# Patient Record
Sex: MALE | Race: BLACK OR AFRICAN AMERICAN | Employment: PART TIME | URBAN - METROPOLITAN AREA
[De-identification: names, ages, dates, MRNs, and addresses within clinical notes are randomized per-mention and may not be internally consistent; named-entity substitution may affect disease eponyms.]

---

## 2019-09-20 ENCOUNTER — HOSPITAL ENCOUNTER (OUTPATIENT)
Age: 19
Setting detail: OBSERVATION
Discharge: HOME | End: 2019-09-22
Attending: EMERGENCY MEDICINE | Admitting: HOSPITALIST
Payer: MEDICARE

## 2019-09-20 ENCOUNTER — APPOINTMENT (OUTPATIENT)
Dept: CT IMAGING | Age: 19
End: 2019-09-20
Attending: EMERGENCY MEDICINE
Payer: MEDICARE

## 2019-09-20 DIAGNOSIS — R56.9 SEIZURE (HCC): Primary | ICD-10-CM

## 2019-09-20 DIAGNOSIS — R56.9 POSTICTAL STATE (HCC): ICD-10-CM

## 2019-09-20 DIAGNOSIS — N17.9 AKI (ACUTE KIDNEY INJURY) (HCC): ICD-10-CM

## 2019-09-20 LAB
ANION GAP SERPL CALC-SCNC: 15 MMOL/L (ref 3–18)
BASOPHILS # BLD: 0 K/UL (ref 0–0.1)
BASOPHILS NFR BLD: 0 % (ref 0–2)
BUN SERPL-MCNC: 13 MG/DL (ref 7–18)
BUN/CREAT SERPL: 9 (ref 12–20)
CALCIUM SERPL-MCNC: 9.2 MG/DL (ref 8.5–10.1)
CHLORIDE SERPL-SCNC: 103 MMOL/L (ref 100–111)
CK SERPL-CCNC: 265 U/L (ref 39–308)
CO2 SERPL-SCNC: 20 MMOL/L (ref 21–32)
CREAT SERPL-MCNC: 1.48 MG/DL (ref 0.6–1.3)
DIFFERENTIAL METHOD BLD: ABNORMAL
EOSINOPHIL # BLD: 0 K/UL (ref 0–0.4)
EOSINOPHIL NFR BLD: 1 % (ref 0–5)
ERYTHROCYTE [DISTWIDTH] IN BLOOD BY AUTOMATED COUNT: 12.8 % (ref 11.6–14.5)
GLUCOSE SERPL-MCNC: 85 MG/DL (ref 74–99)
HCT VFR BLD AUTO: 40.1 % (ref 36–48)
HGB BLD-MCNC: 13.4 G/DL (ref 13–16)
LYMPHOCYTES # BLD: 2.4 K/UL (ref 0.9–3.6)
LYMPHOCYTES NFR BLD: 47 % (ref 21–52)
MCH RBC QN AUTO: 28.9 PG (ref 24–34)
MCHC RBC AUTO-ENTMCNC: 33.4 G/DL (ref 31–37)
MCV RBC AUTO: 86.6 FL (ref 74–97)
MONOCYTES # BLD: 0.5 K/UL (ref 0.05–1.2)
MONOCYTES NFR BLD: 10 % (ref 3–10)
NEUTS SEG # BLD: 2.2 K/UL (ref 1.8–8)
NEUTS SEG NFR BLD: 42 % (ref 40–73)
PLATELET # BLD AUTO: 330 K/UL (ref 135–420)
PMV BLD AUTO: 10.1 FL (ref 9.2–11.8)
POTASSIUM SERPL-SCNC: 3.5 MMOL/L (ref 3.5–5.5)
RBC # BLD AUTO: 4.63 M/UL (ref 4.7–5.5)
SODIUM SERPL-SCNC: 138 MMOL/L (ref 136–145)
WBC # BLD AUTO: 5.2 K/UL (ref 4.6–13.2)

## 2019-09-20 PROCEDURE — 99285 EMERGENCY DEPT VISIT HI MDM: CPT

## 2019-09-20 PROCEDURE — 80048 BASIC METABOLIC PNL TOTAL CA: CPT

## 2019-09-20 PROCEDURE — 96374 THER/PROPH/DIAG INJ IV PUSH: CPT

## 2019-09-20 PROCEDURE — 74011250636 HC RX REV CODE- 250/636: Performed by: EMERGENCY MEDICINE

## 2019-09-20 PROCEDURE — 82550 ASSAY OF CK (CPK): CPT

## 2019-09-20 PROCEDURE — 96361 HYDRATE IV INFUSION ADD-ON: CPT

## 2019-09-20 PROCEDURE — 85025 COMPLETE CBC W/AUTO DIFF WBC: CPT

## 2019-09-20 PROCEDURE — 70450 CT HEAD/BRAIN W/O DYE: CPT

## 2019-09-20 RX ADMIN — SODIUM CHLORIDE, SODIUM LACTATE, POTASSIUM CHLORIDE, AND CALCIUM CHLORIDE 1000 ML: 600; 310; 30; 20 INJECTION, SOLUTION INTRAVENOUS at 23:22

## 2019-09-21 PROBLEM — R56.9 SEIZURE (HCC): Status: ACTIVE | Noted: 2019-09-21

## 2019-09-21 PROBLEM — N17.9 AKI (ACUTE KIDNEY INJURY) (HCC): Status: ACTIVE | Noted: 2019-09-21

## 2019-09-21 LAB
ANION GAP SERPL CALC-SCNC: 7 MMOL/L (ref 3–18)
BUN SERPL-MCNC: 9 MG/DL (ref 7–18)
BUN/CREAT SERPL: 8 (ref 12–20)
CALCIUM SERPL-MCNC: 9 MG/DL (ref 8.5–10.1)
CHLORIDE SERPL-SCNC: 109 MMOL/L (ref 100–111)
CO2 SERPL-SCNC: 26 MMOL/L (ref 21–32)
CREAT SERPL-MCNC: 1.09 MG/DL (ref 0.6–1.3)
GLUCOSE SERPL-MCNC: 84 MG/DL (ref 74–99)
POTASSIUM SERPL-SCNC: 4.1 MMOL/L (ref 3.5–5.5)
SODIUM SERPL-SCNC: 142 MMOL/L (ref 136–145)

## 2019-09-21 PROCEDURE — 80048 BASIC METABOLIC PNL TOTAL CA: CPT

## 2019-09-21 PROCEDURE — 96374 THER/PROPH/DIAG INJ IV PUSH: CPT

## 2019-09-21 PROCEDURE — 74011000258 HC RX REV CODE- 258: Performed by: HOSPITALIST

## 2019-09-21 PROCEDURE — 36415 COLL VENOUS BLD VENIPUNCTURE: CPT

## 2019-09-21 PROCEDURE — 99218 HC RM OBSERVATION: CPT

## 2019-09-21 PROCEDURE — 74011250636 HC RX REV CODE- 250/636: Performed by: EMERGENCY MEDICINE

## 2019-09-21 PROCEDURE — 96376 TX/PRO/DX INJ SAME DRUG ADON: CPT

## 2019-09-21 PROCEDURE — 74011000258 HC RX REV CODE- 258: Performed by: EMERGENCY MEDICINE

## 2019-09-21 PROCEDURE — 74011250636 HC RX REV CODE- 250/636: Performed by: HOSPITALIST

## 2019-09-21 RX ORDER — ACETAMINOPHEN 325 MG/1
650 TABLET ORAL
Status: DISCONTINUED | OUTPATIENT
Start: 2019-09-21 | End: 2019-09-22 | Stop reason: HOSPADM

## 2019-09-21 RX ORDER — SODIUM CHLORIDE 9 MG/ML
100 INJECTION, SOLUTION INTRAVENOUS CONTINUOUS
Status: DISCONTINUED | OUTPATIENT
Start: 2019-09-21 | End: 2019-09-22 | Stop reason: HOSPADM

## 2019-09-21 RX ADMIN — LEVETIRACETAM 1000 MG: 100 INJECTION INTRAVENOUS at 01:39

## 2019-09-21 RX ADMIN — SODIUM CHLORIDE 100 ML/HR: 900 INJECTION, SOLUTION INTRAVENOUS at 03:56

## 2019-09-21 RX ADMIN — SODIUM CHLORIDE 100 ML/HR: 900 INJECTION, SOLUTION INTRAVENOUS at 22:02

## 2019-09-21 RX ADMIN — LEVETIRACETAM 500 MG: 100 INJECTION, SOLUTION INTRAVENOUS at 13:08

## 2019-09-21 NOTE — PROGRESS NOTES
TRANSFER - IN REPORT:    Verbal report received from Marva(name) on Lilia Ganser  being received from ED(unit) for routine progression of care      Report consisted of patients Situation, Background, Assessment and   Recommendations(SBAR). Information from the following report(s) SBAR, Kardex, ED Summary, Intake/Output, MAR and Accordion was reviewed with the receiving nurse. Opportunity for questions and clarification was provided. Assessment completed upon patients arrival to unit and care assumed. Pt ambulated to bathroom with RN in room. Required documentation complete. No c/o pain. NAD. Call light within reach. Will continue to monitor.

## 2019-09-21 NOTE — H&P
History and Physical    Patient: Oralia Mora               Sex: male          DOA: 9/20/2019       YOB: 2000      Age:  20 y.o.        LOS:  LOS: 0 days        CHIEF COMPLAINT: Seizure    Assessment/Plan:     Active Problems:    Seizure ([facility] [address] (9/21/2019)      KORY (acute kidney injury) ([facility] [address] (9/21/2019)      PLAN:  - New onset seizure: Cont IV keppra 500mg bid, monitor for further seizures  - KORY: Cont IV fluids, suspect dehydration. Recheck BMP in AM  - If seizure free can likely d/c on keppra, but will need close outpatient f/u with Neurology    HPI:     Oralia Mora is a 20 y.o. male with h/o traumatic brain injury who presents with seizure. Per EMS, pt had two witnessed seizures at home and EMS was called. Pt does not have h/o seizures but does have h/o TBI in the past. Per ED physician, he presented in a post-ictal state for several hours. He was given IV keppra. He eventually returned to baseline, now he is alert and oriented, with normal speech and affect, but he does not recall the episode of seizures. His last memory is spending time with friends at home. ED physician attempted to transfer the patient to [address] [place] [address] for EEG but they declined. TeleNeurology was consulted and per ED physician Candelario Arroyo stated pt does not need inpatient EEG so he was admitted to [facility] for observation for new onset seizure. Past Medical History:   Diagnosis Date    Brain bleed [facility])        Social History:  Social History     Socioeconomic History    Marital status: SINGLE     Spouse name: Not on file    Number of children: Not on file    Years of education: Not on file    Highest education level: Not on file   Tobacco Use    Smoking status: Passive Smoke Exposure - Never Smoker   Substance and Sexual Activity    Alcohol use: No    Drug use: No       Family History:  No family history on file.     Allergies:  No Known Allergies    Home Medications:  Prior to Admission medications    Not on File         Review of Systems  Review of Systems   Constitutional: Negative for chills and fever. HENT: Negative for congestion and hearing loss. Eyes: Negative for blurred vision and double vision. Respiratory: Negative for cough and shortness of breath. Cardiovascular: Negative for chest pain and palpitations. Gastrointestinal: Negative for abdominal pain, nausea and vomiting. Genitourinary: Negative for dysuria and hematuria. Musculoskeletal: Negative for falls and myalgias. Skin: Negative for rash. Neurological: Positive for seizures. Negative for dizziness and focal weakness. Psychiatric/Behavioral: Negative. Objective:      Visit Vitals  /71   Pulse 70   Temp 98.6 °F (37 °C)   Resp 18   SpO2 98%       Physical Exam:  Ears: hearing is intact  Eyes: Icterus is not present  Lungs: clear to auscultation bilaterally  Heart: regular rate and rhythm, S1, S2 normal, no murmur, click, rub or gallop  Gastrointestinal: soft, non-tender.  Bowel sounds normal. No masses,  no organomegaly  Neurological:  New Focal Deficits are not present  Psychiatric:  Mood is stable    Laboratory Studies:  CMP:   Lab Results   Component Value Date/Time     09/20/2019 09:00 PM    K 3.5 09/20/2019 09:00 PM     09/20/2019 09:00 PM    CO2 20 (L) 09/20/2019 09:00 PM    AGAP 15 09/20/2019 09:00 PM    GLU 85 09/20/2019 09:00 PM    BUN 13 09/20/2019 09:00 PM    CREA 1.48 (H) 09/20/2019 09:00 PM    GFRAA >60 09/20/2019 09:00 PM    GFRNA >60 09/20/2019 09:00 PM    CA 9.2 09/20/2019 09:00 PM     CBC:   Lab Results   Component Value Date/Time    WBC 5.2 09/20/2019 09:00 PM    HGB 13.4 09/20/2019 09:00 PM    HCT 40.1 09/20/2019 09:00 PM     09/20/2019 09:00 PM       Imaging:  CT HEAD WO CONT    (Results Pending)

## 2019-09-21 NOTE — ED NOTES
TRANSFER - ED to INPATIENT REPORT:    SBAR report made available to receiving floor on this patient being transferred to [address] [place] [address] ([facility])  for routine progression of care       Admitting diagnosis Seizure ([facility] [address] [R56.9]    Information from the following report(s) SBAR was made available to receiving floor. Lines:   Peripheral IV 09/20/19 Right Antecubital (Active)   Site Assessment Clean, dry, & intact 9/20/2019  9:00 PM   Phlebitis Assessment 0 9/20/2019  9:00 PM   Infiltration Assessment 0 9/20/2019  9:00 PM   Dressing Status Clean, dry, & intact 9/20/2019  9:00 PM   Dressing Type Transparent 9/20/2019  9:00 PM   Hub Color/Line Status Green 9/20/2019  9:00 PM        Medication list unable to confirm    Opportunity for questions and clarification was provided.       Patient is oriented to time, place, person and situation NONE  Patient is  continent and ambulatory without assist     Valuables transported with patient     Patient transported with:   Tech    MAP (Monitor): 84 =Monitored (most recent)  Vitals w/ MEWS Score (last day)     Date/Time MEWS Score Pulse Resp Temp BP Level of Consciousness SpO2    09/21/19 0316  1  73  16  98.3 °F (36.8 °C)  114/56  Alert  100 %    09/21/19 0233          107/66        09/21/19 0130    79  18    127/72    99 %    09/21/19 0100  1  85  19  98.1 °F (36.7 °C)  135/74  Alert  99 %    09/21/19 0051    80  14    140/66    (!) 66 %    09/20/19 2200    94  22    146/79    100 %    09/20/19 2130    98  18    129/64    100 %    09/20/19 2100    94  24    124/62    98 %    09/20/19 2052  4  (!) 104  20  97.4 °F (36.3 °C)  113/67  (!) Responds to Pain  99 %

## 2019-09-21 NOTE — ED TRIAGE NOTES
Patient comes in by EMS for complaints of a seizure at home. Patient does not have a history of seizures but does have a history of a TBI. Patient had a witnessed seizure at home and a witnessed seizure at home.

## 2019-09-21 NOTE — PROGRESS NOTES
conducted an initial consultation and Spiritual Assessment for Kristyn Jenkins, who is a 20 y.o.,male. Patients Primary Language is: [place]. According to the patients EMR Lutheran Affiliation is: No [employer]. The reason the Patient came to the hospital is:   Patient Active Problem List    Diagnosis Date Noted    Seizure [facility]) 09/21/2019    KORY (acute kidney injury) ([facility] [address] 09/21/2019        The  provided the following Interventions:  Initiated a relationship of care and support. Explored issues of john, spirituality and/or Tenriism needs while hospitalized. Listened empathically. Provided chaplaincy education. Provided information about Spiritual Care Services. Offered prayer and assurance of continued prayers on patient's behalf. Chart reviewed. The following outcomes were achieved:  Patient shared some information about their medical narrative and spiritual journey/beliefs. Patient processed feeling about current hospitalization. Patient expressed gratitude for the 's visit. Assessment:  Patient did not indicate any spiritual or Tenriism issues which require Spiritual Care Services interventions at this time. Patient does not have any Tenriism/cultural needs that will affect patients preferences in health care. Plan:  Chaplains will continue to follow and will provide pastoral care on an as needed or requested basis.  recommends bedside caregivers page  on duty if patient shows signs of acute spiritual or emotional distress.     [address] [facility]   Staff [address] [facility]   [phone]

## 2019-09-21 NOTE — PROGRESS NOTES
Problem: Discharge Planning  Goal: *Discharge to safe environment  Outcome: Resolved/Met   Plan home to sisters. Reason for Admission:   Seizures, new                   RRAT Score:  4                   Plan for utilizing home health:  none                        Current Advanced Directive/Advance Care Plan: none                         Transition of Care Plan:   Spoke with pt. He lives at his sisters in [facility], here visiting. Plans to return to [place] on sept 30th. He will go to another sisters here in [facility] when dc'd . His sister Earlene Dickson will transport him home. [place] [employer] [place], but no card  In system. Instructed him he will need to f/u with ins and call them to see what md  is in network with his jagdish. [place]. He also [place] need [place]  [place] if continues to live [place]. He is unsure what he will do. He is independent with adls and amb. No dme. He had a traumatic brain injury in 2016. Patient has designated ______sister__________________ to participate in his/her discharge plan and to receive any needed information. Name: ana luisa cook   Address: [place]  Phone number: [phone]     Patient and/or next of kin has been given the Outpatient Observation Information and Notification letter and all questions answered. Plan home, no needs anticipated. Care Management Interventions  PCP Verified by CM: Yes  Palliative Care Criteria Met (RRAT>21 & CHF Dx)?: No  Mode of Transport at Discharge:  Other (see comment)  Transition of Care Consult (CM Consult): Discharge Planning  Discharge Durable Medical Equipment: No  Physical Therapy Consult: No  Occupational Therapy Consult: No  Speech Therapy Consult: No  Current Support Network: Relative's Home  Confirm Follow Up Transport: Family  Plan discussed with Pt/Family/Caregiver: Yes  Discharge Location  Discharge Placement: Home

## 2019-09-21 NOTE — PROGRESS NOTES
Problem: Falls - Risk of  Goal: *Absence of Falls  Description  Document Geraldine Loza Fall Risk and appropriate interventions in the flowsheet.   Outcome: Progressing Towards Goal  Note:   Fall Risk Interventions:  Mobility Interventions: Patient to call before getting OOB         Medication Interventions: Patient to call before getting OOB, Teach patient to arise slowly                   Problem: Patient Education: Go to Patient Education Activity  Goal: Patient/Family Education  Outcome: Progressing Towards Goal

## 2019-09-21 NOTE — PROGRESS NOTES
6682-  Bedside and Verbal shift change report given to Kameron Daniels, RN (oncoming nurse) by Cassy De La Paz RN (offgoing nurse). Report included the following information SBAR, Kardex, Intake/Output, MAR and Recent Results. 1915-  Bedside and Verbal shift change report given to Thalia Hauser RN (oncoming nurse) by Kameron Daniels RN (offgoing nurse). Report included the following information SBAR, Kardex, Intake/Output, MAR and Recent Results.

## 2019-09-21 NOTE — ED PROVIDER NOTES
EMERGENCY DEPARTMENT HISTORY AND PHYSICAL EXAM      Date: 9/20/2019  Patient Name: Hailey Eli    History of Presenting Illness     Chief Complaint   Patient presents with    Seizure       History (Context): Hailey Eli is a 20 y.o. with remote history of traumatic brain injury, but no personal seizure history, save for in the wake of the acute insult to his brain, who presents after acute onset, now resolved, seizure like activity that was self limited and without exacerbating or relieving features or other associated symptoms. He had 2 such events. The patient did did experience a postictal state. He is currently postictal, confused, and cannot otherwise participate in history taking. The entirety of the history was gathered from family and EMS. The patient cannot attend to review of systems due to his confusion. However, at this time he denies chest pain, back pain, nausea, vomiting, headache, numbness, tingling, weakness. PCP: Danette Gutierrez MD    Current Outpatient Medications   Medication Sig Dispense Refill    levETIRAcetam (KEPPRA) 500 mg tablet Take 1 Tab by mouth two (2) times a day. 61 Tab 0       Past History     Past Medical History:  Past Medical History:   Diagnosis Date    Brain bleed ([facility] [address]        Past Surgical History:  No prior surgeries  Family History:  No significant family history    Social History:  Social History     Tobacco Use    Smoking status: Passive Smoke Exposure - Never Smoker   Substance Use Topics    Alcohol use: No    Drug use: No       Allergies:  No Known Allergies    PMH, PSH, family history, social history, allergies reviewed with the patient with significant items noted above. Review of Systems   As per HPI, otherwise reviewed and negative.      Physical Exam     Vitals:    09/21/19 2159 09/22/19 0030 09/22/19 0445 09/22/19 0750   BP: 118/71 116/55 101/71 100/59   Pulse: 63 (!) 58 69 65   Resp: 16 15 15 14   Temp: 98.2 °F (36.8 °C) 97.6 °F (36.4 °C) 97.9 °F (36.6 °C) 97.7 °F (36.5 °C)   SpO2: 100% 100% 100% 100%       Gen: No acute distress  HEENT: Normocephalic, sclera anicteric  Cardiovascular: Normal rate, regular rhythm, no murmurs, rubs, gallops. Pulses intact and equal distally. Pulmonary: No respiratory distress. No stridor. Clear lungs. ABD: Soft, nontender, nondistended. Neuro: Alert and oriented to person, thinks he is in [facility], cranial nerves II through XII intact, pupils equal round reactive to light, normal facial symmetry, full strength and sensation throughout, 2+ reflexes in the bilateral patella, no Babinski, normal gait, rapid alternating motions normal, normal finger-nose-finger  Psych: Normal thought content and thought processes. : No CVA tenderness  EXT: Moves all extremities well. No cyanosis or clubbing. Skin: Warm and well-perfused. Diagnostic Study Results     Labs -     No results found for this or any previous visit (from the past 12 hour(s)). Radiologic Studies -   CT HEAD WO CONT   Final Result   IMPRESSION:         1. No acute intracranial abnormality. Note: Preliminary report sent to the Emergency Department by the radiology   resident at the time of the study. CT Results  (Last 48 hours)    None        CXR Results  (Last 48 hours)    None            Medical Decision Making   I am the first provider for this patient. I reviewed the vital signs, available nursing notes, past medical history, past surgical history, family history and social history. Vital Signs-Reviewed the patient's vital signs. Records Reviewed: Personally, on initial evaluation    MDM:   Patient presents with seizure-like symptoms. Exam significant for persistent postictal state. He had 2 seizures.    DDX considered: Seizure, atypical migraine     DDX thought to be much  less likely but also considered due to high risk condition: Stroke, nonepileptic seizure, malingering    The patient is not in status epilepticus    Plan:   Close observation  Cardiac monitoring    Orders as below:  Orders Placed This Encounter    CT HEAD WO CONT    CBC WITH AUTOMATED DIFF    METABOLIC PANEL, BASIC    CK    METABOLIC PANEL, BASIC    IP CONSULT TO NEUROLOGY    IP CONSULT TO NEUROLOGY    lactated ringers bolus infusion 1,000 mL    levETIRAcetam (KEPPRA) 1,000 mg in 0.9% sodium chloride 100 mL IVPB    DISCONTD: acetaminophen (TYLENOL) tablet 650 mg    DISCONTD: 0.9% sodium chloride infusion    DISCONTD: levETIRAcetam (KEPPRA) 500 mg in 0.9% sodium chloride (MBP/ADV) 100 mL MBP    DISCONTD: influenza vaccine 2019-20 (6 mos+)(PF) (FLUARIX/FLULAVAL/FLUZONE QUAD) injection 0.5 mL    Please enter Height & ACTUAL Weight in Kg in [facility]    levETIRAcetam (KEPPRA) 500 mg tablet    INITIAL PHYSICIAN ORDER: OBSERVATION/OUTPATIENT IN A BED OBSERVATION; Medical; seizure        ED Course:   Patient was postictal for approximately 2-1/2 hours. There are multiple conversations had with the transfer center and had with the hospitalist.  There was a concern that the patient needed prolonged EEG or an EEG inpatient. Tele-neurology was also consulted. Ultimately, the patient was admitted to this hospital on a stepdown status. Critical Care Time:  The services I provided to this patient were to treat and/or prevent clinically significant deterioration that could result in the failure of one or more body systems and/or organ systems due to new onset seizures with prolonged postictal state, technically meeting the definition of status epilepticus.     Services included the following:  -reviewing nursing notes and old charts  -vital sign assessments  -direct patient care  -medication orders and management  -interpreting and reviewing diagnostic studies/labs  -re-evaluations  -documentation time    Aggregate critical care time was 35 minutes, which includes only time during which I was engaged in work directly related to the patient's care as described above, whether I was at bedside or elsewhere in the Emergency Department. It did not include time spent performing other reported procedures or the services of residents, students, nurses, or advance practice providers. DMT      Diagnosis     Clinical Impression:   1. Seizure ([facility] [address]    2. KORY (acute kidney injury) ([facility] [address]    3. Postictal state [facility])        Signed,  Timi Duff MD  Emergency Physician  [facility]    As a voice dictation software was utilized to dictate this note, minor word transpositions can occur. I apologize for confusing wording and typographic errors. Please feel free to contact me for clarification.

## 2019-09-21 NOTE — CONSULTS
Teleneurology Consult    Patient ID  Name:  Cristal Holder  :  2000  MRN:  [id number]  Age:  20 y.o. PCP:  Brenda, MD Danette    Subjective:     Encounter Date:  2019    Referring Physician: No ref. provider found    Chief Complaint   Patient presents with    Seizure       History of Present Illness:   Cristal Holder is a 20 y.o. Presented with generalized motor seizure followed by post ictal confusion  Currently he is back to baseline  Ct head no acute abnormalities, electrolytes are with in normal limits  He has prior history of head injury where he was hospitalized for 8 days    Current Facility-Administered Medications   Medication Dose Route Frequency Provider Last Rate Last Dose    acetaminophen (TYLENOL) tablet 650 mg  650 mg Oral Q4H PRN Carmelo Melendez MD        0.9% sodium chloride infusion  100 mL/hr IntraVENous CONTINUOUS Carmelo Melendez  mL/hr at 19 0356 100 mL/hr at 19 0356    levETIRAcetam (KEPPRA) 500 mg in 0.9% sodium chloride (MBP/ADV) 100 mL MBP  500 mg IntraVENous Q12H Carmelo Melendez MD        [START ON 2019] influenza vaccine 2019- (6 mos+)(PF) (FLUARIX/FLULAVAL/FLUZONE QUAD) injection 0.5 mL  0.5 mL IntraMUSCular PRIOR TO DISCHARGE Carmelo Melendez MD         No Known Allergies  Patient Active Problem List   Diagnosis Code    Seizure ([facility] R56.9    KORY (acute kidney injury) ([facility] N17.9     Past Medical History:   Diagnosis Date    Brain bleed ([facility]       No past surgical history on file. No family history on file.    Social History     Socioeconomic History    Marital status: SINGLE     Spouse name: Not on file    Number of children: Not on file    Years of education: Not on file    Highest education level: Not on file   Tobacco Use    Smoking status: Passive Smoke Exposure - Never Smoker   Substance and Sexual Activity    Alcohol use: No    Drug use: No       Review of Systems:  Pertinent items are noted in HPI. Objective:     Vitals:    09/21/19 0316 09/21/19 0351 09/21/19 0452 09/21/19 0817   BP: 114/56 130/71 125/81 134/73   Pulse: 73 70 71 75   Resp: 16 18 17 18   Temp: 98.3 °F (36.8 °C) 98.6 °F (37 °C) 98 °F (36.7 °C) 97.7 °F (36.5 °C)   SpO2: 100% 98% 99% 100%       Physical Exam:    General:  Patient seen via telemedicine video encounter utilizing a tele-presenter. No acute distress. NEUROLOGICAL EXAMINATION:     Mental Status:   Alert and oriented to person, place, and time with recent and remote memory intact. Attention span and concentration are normal.   Speech is fluent with a full fund of knowledge. Language: Repitition intact, fluent, naming intact, reading intact      Cranial Nerves:    II, III, IV, VI:  Visual fields are normal to confrontation. Pupils are equal, round, and reactive to light and accommodation. Extra-ocular movements are full  No ptosis or nystagmus. V-XII:   Face is symmetric  Normal sensation. The palate rises symmetrically and the tongue protrudes midline. Sternocleidomastoids 5/5. Motor Examination: No drift throughout. No involuntary movements, Normal tone    Sensory exam:  Normal throughout to light touch per telepresenter  Coordination:  No dysmetria, No resting or intention tremor    Gait and Station:  Not tested  Reflexes:  Not tested by telepresenter  Labs  Recent Results (from the past 24 hour(s))   CBC WITH AUTOMATED DIFF    Collection Time: 09/20/19  9:00 PM   Result Value Ref Range    WBC 5.2 4.6 - 13.2 K/uL    RBC 4.63 (L) 4.70 - 5.50 M/uL    HGB 13.4 13.0 - 16.0 g/dL    HCT 40.1 36.0 - 48.0 %    MCV 86.6 74.0 - 97.0 FL    MCH 28.9 24.0 - 34.0 PG    MCHC 33.4 31.0 - 37.0 g/dL    RDW 12.8 11.6 - 14.5 %    PLATELET 363 373 - 397 K/uL    MPV 10.1 9.2 - 11.8 FL    NEUTROPHILS 42 40 - 73 %    LYMPHOCYTES 47 21 - 52 %    MONOCYTES 10 3 - 10 %    EOSINOPHILS 1 0 - 5 %    BASOPHILS 0 0 - 2 %    ABS. NEUTROPHILS 2.2 1.8 - 8.0 K/UL    ABS. LYMPHOCYTES 2.4 0.9 - 3.6 K/UL    ABS. MONOCYTES 0.5 0.05 - 1.2 K/UL    ABS. EOSINOPHILS 0.0 0.0 - 0.4 K/UL    ABS. BASOPHILS 0.0 0.0 - 0.1 K/UL    DF AUTOMATED     METABOLIC PANEL, BASIC    Collection Time: 09/20/19  9:00 PM   Result Value Ref Range    Sodium 138 136 - 145 mmol/L    Potassium 3.5 3.5 - 5.5 mmol/L    Chloride 103 100 - 111 mmol/L    CO2 20 (L) 21 - 32 mmol/L    Anion gap 15 3.0 - 18 mmol/L    Glucose 85 74 - 99 mg/dL    BUN 13 7.0 - 18 MG/DL    Creatinine 1.48 (H) 0.6 - 1.3 MG/DL    BUN/Creatinine ratio 9 (L) 12 - 20      GFR est AA >60 >60 ml/min/1.73m2    GFR est non-AA >60 >60 ml/min/1.73m2    Calcium 9.2 8.5 - 10.1 MG/DL   CK    Collection Time: 09/20/19  9:00 PM   Result Value Ref Range     39 - 308 U/L        Relevant Radiology:   Ct Head Wo Cont    Result Date: 9/21/2019  EXAM: CT head INDICATION: Seizure. COMPARISON: None. TECHNIQUE: Axial CT imaging of the head was performed without intravenous contrast. One or more dose reduction techniques were used on this CT: automated exposure control, adjustment of the mAs and/or kVp according to patient size, and iterative reconstruction techniques. The specific techniques used on this CT exam have been documented in the patient's electronic medical record. Digital Imaging and Communications in Medicine (DICOM) format image data are available to nonaffiliated external healthcare facilities or entities on a secure, media free, reciprocally searchable basis with patient authorization for at least a 12-month period after this study. _______________ FINDINGS: BRAIN AND POSTERIOR FOSSA: The sulci, folia, ventricles and basal cisterns are within normal limits for the patient?s age. There is no intracranial hemorrhage, mass effect, or midline shift. There are no areas of abnormal parenchymal attenuation. EXTRA-AXIAL SPACES AND MENINGES: There are no abnormal extra-axial fluid collections. CALVARIUM: Intact.  SINUSES: Imaged paranasal sinuses and mastoid air cells are clear. OTHER: None. _______________     IMPRESSION: 1. No acute intracranial abnormality. Note: Preliminary report sent to the Emergency Department by the radiology resident at the time of the study.         Impression:     New onset seizure disorder, etiology prior history of head injury  Findings including seizure precautions discussed with patient  Plan:     Keppra 500mg PO BID  Follow up outpatient neurology      Signed By:  Gerardo Montiel MD     9/21/2019    [facility] Tele[facility] for Inpatient Consultation

## 2019-09-22 VITALS
DIASTOLIC BLOOD PRESSURE: 59 MMHG | HEART RATE: 65 BPM | RESPIRATION RATE: 14 BRPM | TEMPERATURE: 97.7 F | SYSTOLIC BLOOD PRESSURE: 100 MMHG | OXYGEN SATURATION: 100 %

## 2019-09-22 LAB
ANION GAP SERPL CALC-SCNC: 4 MMOL/L (ref 3–18)
BUN SERPL-MCNC: 8 MG/DL (ref 7–18)
BUN/CREAT SERPL: 8 (ref 12–20)
CALCIUM SERPL-MCNC: 8.7 MG/DL (ref 8.5–10.1)
CHLORIDE SERPL-SCNC: 109 MMOL/L (ref 100–111)
CO2 SERPL-SCNC: 29 MMOL/L (ref 21–32)
CREAT SERPL-MCNC: 0.96 MG/DL (ref 0.6–1.3)
GLUCOSE SERPL-MCNC: 87 MG/DL (ref 74–99)
POTASSIUM SERPL-SCNC: 4.3 MMOL/L (ref 3.5–5.5)
SODIUM SERPL-SCNC: 142 MMOL/L (ref 136–145)

## 2019-09-22 PROCEDURE — 99218 HC RM OBSERVATION: CPT

## 2019-09-22 PROCEDURE — 36415 COLL VENOUS BLD VENIPUNCTURE: CPT

## 2019-09-22 PROCEDURE — 96376 TX/PRO/DX INJ SAME DRUG ADON: CPT

## 2019-09-22 PROCEDURE — 80048 BASIC METABOLIC PNL TOTAL CA: CPT

## 2019-09-22 PROCEDURE — 74011000258 HC RX REV CODE- 258: Performed by: HOSPITALIST

## 2019-09-22 PROCEDURE — 74011250636 HC RX REV CODE- 250/636: Performed by: HOSPITALIST

## 2019-09-22 RX ORDER — LEVETIRACETAM 500 MG/1
500 TABLET ORAL 2 TIMES DAILY
Qty: 60 TAB | Refills: 0 | OUTPATIENT
Start: 2019-09-22 | End: 2020-02-19

## 2019-09-22 RX ADMIN — LEVETIRACETAM 500 MG: 100 INJECTION, SOLUTION INTRAVENOUS at 02:42

## 2019-09-22 NOTE — DISCHARGE INSTRUCTIONS
Patient Education        Seizure: Care Instructions  Your Care Instructions    Seizures are caused by abnormal patterns of electrical signals in the brain. They are different for each person. Seizures can affect movement, speech, vision, or awareness. Some people have only slight shaking of a hand and do not pass out. Other people may pass out and have violent shaking of the whole body. Some people appear to stare into space. They are awake, but they can't respond normally. Later, they may not remember what happened. You may need tests to identify the type and cause of the seizures. A seizure may occur only once, or you may have them more than one time. Taking medicines as directed and following up with your doctor may help keep you from having more seizures. The doctor has checked you carefully, but problems can develop later. If you notice any problems or new symptoms, get medical treatment right away. Follow-up care is a key part of your treatment and safety. Be sure to make and go to all appointments, and call your doctor if you are having problems. It's also a good idea to know your test results and keep a list of the medicines you take. How can you care for yourself at home? · Be safe with medicines. Take your medicines exactly as prescribed. Call your doctor if you think you are having a problem with your medicine. · Do not do any activity that could be dangerous to you or others until your doctor says it is safe to do so. For example, do not drive a car, operate machinery, swim, or climb ladders. · Be sure that anyone treating you for any health problem knows that you have had a seizure and what medicines you are taking for it. · Identify and avoid things that may make you more likely to have a seizure. These may include lack of sleep, alcohol or drug use, stress, or not eating. · Make sure you go to your follow-up appointment. When should you call for help?   Call 911 anytime you think you may need emergency care. For example, call if:    · You have another seizure.     · You have more than one seizure in 24 hours.     · You have new symptoms, such as trouble walking, speaking, or thinking clearly.    Call your doctor now or seek immediate medical care if:    · You are not acting normally.    Watch closely for changes in your health, and be sure to contact your doctor if you have any problems. Where can you learn more? Go to http://ita-cynthia.info/. Enter G262 in the search box to learn more about \"Seizure: Care Instructions. \"  Current as of: March 28, 2019  Content Version: 12.2  © 8712-9521 [employer], [employer]. Care instructions adapted under license by [employer] (which disclaims liability or warranty for this information). If you have questions about a medical condition or this instruction, always ask your healthcare professional. [address] any warranty or liability for your use of this information.

## 2019-09-22 NOTE — PROGRESS NOTES
0715-  Bedside and Verbal shift change report given to Junie Hughes RN (oncoming nurse) by Julia Wrad RN (offgoing nurse). Report included the following information SBAR, Kardex, Intake/Output, MAR and Recent Results.

## 2019-09-22 NOTE — PROGRESS NOTES
Problem: Discharge Planning  Goal: *Discharge to safe environment  Outcome: Resolved/Met   Plan home to sisters. Care Management Interventions  PCP Verified by CM: Yes  Palliative Care Criteria Met (RRAT>21 & CHF Dx)?: No  Mode of Transport at Discharge:  Other (see comment)  Transition of Care Consult (CM Consult): Discharge Planning  Discharge Durable Medical Equipment: No  Physical Therapy Consult: No  Occupational Therapy Consult: No  Speech Therapy Consult: No  Current Support Network: Relative's Home  Confirm Follow Up Transport: Family  Plan discussed with Pt/Family/Caregiver: Yes  Discharge Location  Discharge Placement: Home

## 2019-09-22 NOTE — PROGRESS NOTES
1915  Received bedside shift report from [employer]. Patient resting comfortably in  Bed. Call bell within reach. 0400  Patient rested comfortably all night, no complaints of pain. 0725 Bedside shift change report given to Radames Urbina RN (oncoming nurse) by Angela Evans (offgoing nurse). Report included the following information SBAR, Kardex, Intake/Output and MAR.

## 2019-09-22 NOTE — PROGRESS NOTES
Problem: Falls - Risk of  Goal: *Absence of Falls  Description  Document Ernesto Viramontes Fall Risk and appropriate interventions in the flowsheet. Outcome: Progressing Towards Goal  Note:   Fall Risk Interventions:  Mobility Interventions: Patient to call before getting OOB         Medication Interventions: Patient to call before getting OOB, Teach patient to arise slowly                   Problem: Patient Education: Go to Patient Education Activity  Goal: Patient/Family Education  Outcome: Progressing Towards Goal     Problem: Pressure Injury - Risk of  Goal: *Prevention of pressure injury  Description  Document Mp Scale and appropriate interventions in the flowsheet. Outcome: Progressing Towards Goal  Note:   Pressure Injury Interventions:             Activity Interventions: Increase time out of bed    Mobility Interventions: HOB 30 degrees or less    Nutrition Interventions: Document food/fluid/supplement intake                     Problem: Patient Education: Go to Patient Education Activity  Goal: Patient/Family Education  Outcome: Progressing Towards Goal

## 2019-09-22 NOTE — PROGRESS NOTES
Problem: Falls - Risk of  Goal: *Absence of Falls  Description  Document Esaw Hutchinson Fall Risk and appropriate interventions in the flowsheet. Outcome: Progressing Towards Goal  Note:   Fall Risk Interventions:  Mobility Interventions: Patient to call before getting OOB         Medication Interventions: Patient to call before getting OOB, Teach patient to arise slowly                   Problem: Patient Education: Go to Patient Education Activity  Goal: Patient/Family Education  Outcome: Progressing Towards Goal     Problem: Pressure Injury - Risk of  Goal: *Prevention of pressure injury  Description  Document Mp Scale and appropriate interventions in the flowsheet. Outcome: Progressing Towards Goal  Note:   Pressure Injury Interventions:             Activity Interventions: Increase time out of bed    Mobility Interventions: HOB 30 degrees or less    Nutrition Interventions: Document food/fluid/supplement intake                     Problem: Patient Education: Go to Patient Education Activity  Goal: Patient/Family Education  Outcome: Progressing Towards Goal

## 2019-09-22 NOTE — DISCHARGE SUMMARY
[facility]  Hospitalist Division    Discharge Summary    Patient: Yash Anderson MRN: [id number]  CSN: [id number]    YOB: 2000  Age: 20 y.o. Sex: male    DOA: 9/20/2019 LOS:  LOS: 0 days   Discharge Date:      Admission Diagnoses: Seizure [facility]) [R56.9]    Discharge Diagnoses:    Problem List as of 9/22/2019 Never Reviewed          Codes Class Noted - Resolved    * (Principal) Seizure ([facility] ICD-10-CM: R56.9  ICD-9-CM: 780.39  9/21/2019 - Present        KORY (acute kidney injury) ([facility] ICD-10-CM: N17.9  ICD-9-CM: 584.9  9/21/2019 - Present              Discharge Condition: Stable    Discharge To: Home    Consults: Neurology    HPI: Yash Anderson is a 20 y.o. male with h/o traumatic brain injury who presents with seizure. Per EMS, pt had two witnessed seizures at home and EMS was called. Pt does not have h/o seizures but does have h/o TBI in the past. Per ED physician, he presented in a post-ictal state for several hours. He was given IV keppra. He eventually returned to baseline, now he is alert and oriented, with normal speech and affect, but he does not recall the episode of seizures. His last memory is spending time with friends at home. ED physician attempted to transfer the patient to [address] [place] [address] for EEG but they declined. TeleNeurology was consulted and per ED physician Cathy Search stated pt does not need inpatient EEG so he was admitted to [facility] for observation for new onset seizure. Hospital Course: KORY resolved with IV fluids. Neurology was consulted and agreed with Keppra 500mg BID. Patient remained seizure free throughout admission on keppra. VS and labs stable for discharge.       Physical Exam:  General appearance: alert, cooperative, no distress, appears stated age  Head: Normocephalic, without obvious abnormality, atraumatic  Lungs: clear to auscultation bilaterally  Heart: regular rate and rhythm, S1, S2 normal, no murmur, click, rub or gallop  Abdomen: soft, non-tender. Bowel sounds normal.   Extremities: no cyanosis or edema  Skin: Skin color, texture normal.  Neurologic: no focal deficits, motor/sensory intact  PSY: Mood and affect normal, appropriately behaved    Significant Diagnostic Studies:     BMP:   Lab Results   Component Value Date/Time     09/22/2019 04:30 AM    K 4.3 09/22/2019 04:30 AM     09/22/2019 04:30 AM    CO2 29 09/22/2019 04:30 AM    AGAP 4 09/22/2019 04:30 AM    GLU 87 09/22/2019 04:30 AM    BUN 8 09/22/2019 04:30 AM    CREA 0.96 09/22/2019 04:30 AM    GFRAA >60 09/22/2019 04:30 AM    GFRNA >60 09/22/2019 04:30 AM       Ct Head Wo Cont    Result Date: 9/21/2019  EXAM: CT head INDICATION: Seizure. COMPARISON: None. TECHNIQUE: Axial CT imaging of the head was performed without intravenous contrast. One or more dose reduction techniques were used on this CT: automated exposure control, adjustment of the mAs and/or kVp according to patient size, and iterative reconstruction techniques. The specific techniques used on this CT exam have been documented in the patient's electronic medical record. Digital Imaging and Communications in Medicine (DICOM) format image data are available to nonaffiliated external healthcare facilities or entities on a secure, media free, reciprocally searchable basis with patient authorization for at least a 12-month period after this study. _______________ FINDINGS: BRAIN AND POSTERIOR FOSSA: The sulci, folia, ventricles and basal cisterns are within normal limits for the patient?s age. There is no intracranial hemorrhage, mass effect, or midline shift. There are no areas of abnormal parenchymal attenuation. EXTRA-AXIAL SPACES AND MENINGES: There are no abnormal extra-axial fluid collections. CALVARIUM: Intact. SINUSES: Imaged paranasal sinuses and mastoid air cells are clear. OTHER: None. _______________     IMPRESSION: 1. No acute intracranial abnormality.  Note: Preliminary report sent to the Emergency Department by the radiology resident at the time of the study. Discharge Medications:     Current Discharge Medication List      START taking these medications    Details   levETIRAcetam (KEPPRA) 500 mg tablet Take 1 Tab by mouth two (2) times a day.   Qty: 60 Tab, Refills: 0             Activity: Activity as tolerated and no driving for 6 months, caution with swimming, climbing ladders, etc.    Diet: Regular Diet    Wound Care: None needed    Follow-up: 1 week with PCP, 2 weeks with neurology    Discharge time: >35 minutes    [facility]  Office:  [phone]  Pager: [phone]      9/22/2019, 10:19 AM

## 2019-09-22 NOTE — PROGRESS NOTES
Patient given discharge instructions including information on seizures and keppra and follow up with neurologist in 2 weeks. Patient voiced understanding and waiting for a ride.

## 2020-02-19 ENCOUNTER — HOSPITAL ENCOUNTER (EMERGENCY)
Age: 20
Discharge: HOME | End: 2020-02-19
Attending: EMERGENCY MEDICINE
Payer: MEDICARE

## 2020-02-19 VITALS
HEART RATE: 80 BPM | SYSTOLIC BLOOD PRESSURE: 151 MMHG | DIASTOLIC BLOOD PRESSURE: 74 MMHG | OXYGEN SATURATION: 100 % | RESPIRATION RATE: 17 BRPM | TEMPERATURE: 98.2 F

## 2020-02-19 DIAGNOSIS — Z76.0 MEDICATION REFILL: Primary | ICD-10-CM

## 2020-02-19 PROCEDURE — 99282 EMERGENCY DEPT VISIT SF MDM: CPT

## 2020-02-19 RX ORDER — LEVETIRACETAM 500 MG/1
500 TABLET ORAL 2 TIMES DAILY
Qty: 60 TAB | Refills: 0 | Status: SHIPPED | OUTPATIENT
Start: 2020-02-19

## 2020-02-19 NOTE — ED TRIAGE NOTES
Pt states ran out of Levetiracetam 500 mg BID. Pt states that he is new to [facility].

## 2020-02-19 NOTE — ED PROVIDER NOTES
EMERGENCY DEPARTMENT HISTORY AND PHYSICAL EXAM    2:35 PM      Date: 2/19/2020  Patient Name: Charmaine Chan    History of Presenting Illness     Chief Complaint   Patient presents with    Medication Refill         History Provided By: Patient    Additional History (Context): Charmaine Chan is a 20 y.o. male with seizure who presents to the ED for medication refill. Patient notes he took his last Keppra this morning. Denies fever or chills, chest pain, shortness of breath, dizziness, headache, seizure-like activity. Notes he does not have a primary care physician for outpatient follow-up. PCP: Danette Gutierrez MD    Current Outpatient Medications   Medication Sig Dispense Refill    levETIRAcetam (KEPPRA) 500 mg tablet Take 1 Tab by mouth two (2) times a day. 61 Tab 0       Past History     Past Medical History:  Past Medical History:   Diagnosis Date    Brain bleed [facility])        Past Surgical History:  No past surgical history on file. Family History:  No family history on file. Social History:  Social History     Tobacco Use    Smoking status: Passive Smoke Exposure - Never Smoker    Smokeless tobacco: Never Used   Substance Use Topics    Alcohol use: No    Drug use: Yes     Types: Marijuana       Allergies:  No Known Allergies      Review of Systems       Review of Systems   Constitutional: Negative for chills and fever. Respiratory: Negative for shortness of breath. Cardiovascular: Negative for chest pain. Gastrointestinal: Negative for abdominal pain, nausea and vomiting. Skin: Negative for rash. Neurological: Negative for weakness. All other systems reviewed and are negative. Physical Exam     Visit Vitals  /74 (BP 1 Location: Left arm, BP Patient Position: Sitting)   Pulse 80   Temp 98.2 °F (36.8 °C)   Resp 17   SpO2 100%         Physical Exam  Vitals signs and nursing note reviewed. Constitutional:       General: He is not in acute distress.      Appearance: He is well-developed. He is not diaphoretic. HENT:      Head: Normocephalic and atraumatic. Neck:      Musculoskeletal: Normal range of motion and neck supple. Cardiovascular:      Rate and Rhythm: Normal rate and regular rhythm. Heart sounds: Normal heart sounds. No murmur. No friction rub. No gallop. Pulmonary:      Effort: Pulmonary effort is normal. No respiratory distress. Breath sounds: Normal breath sounds. No wheezing or rales. Musculoskeletal: Normal range of motion. Skin:     General: Skin is warm. Findings: No rash. Neurological:      General: No focal deficit present. Mental Status: He is alert and oriented to person, place, and time. Gait: Gait normal.           Diagnostic Study Results     Labs -  No results found for this or any previous visit (from the past 12 hour(s)). Radiologic Studies -   No orders to display         Medical Decision Making   I am the first provider for this patient. I reviewed the vital signs, available nursing notes, past medical history, past surgical history, family history and social history. Vital Signs-Reviewed the patient's vital signs. Records Reviewed: Nursing Notes and Old Medical Records (Time of Review: 2:35 PM)    ED Course: Progress Notes, Reevaluation, and Consults:  2:35 PM  Reviewed plan with patient. Discussed need for close outpatient follow-up this week for reassessment. Discussed strict return precautions, including fever, weakness, or any other medical concerns.  evaluated patient. He notes he does not need a primary care follow-up in this area as he is moving soon    Provider Notes (Medical Decision Making): 30-year-old male with history of seizures who presents to the ED for medication refill. Patient denies any complaints. Took his last Keppra this morning. Will prescribe 30 days of Keppra and recommend close follow-up for further assessment.       Diagnosis     Clinical Impression: 1. Medication refill        Disposition: home     Follow-up Information     Follow up With Specialties Details Why [facility]    [facility] Emergency Medicine  If symptoms worsen [address] [address]  [place] [address]  Schedule an appointment as soon as possible for a visit  Simran Sinclair [address]  [address]  [phone]           Patient's Medications   Start Taking    No medications on file   Continue Taking    No medications on file   These Medications have changed    Modified Medication Previous Medication    LEVETIRACETAM (KEPPRA) 500 MG TABLET levETIRAcetam (KEPPRA) 500 mg tablet       Take 1 Tab by mouth two (2) times a day. Take 1 Tab by mouth two (2) times a day. Stop Taking    LEVETIRACETAM (KEPPRA) 500 MG TABLET    Take 1 Tab by mouth two (2) times a day. Dictation disclaimer:  Please note that this dictation was completed with [employer], the computer voice recognition software. Quite often unanticipated grammatical, syntax, homophones, and other interpretive errors are inadvertently transcribed by the computer software. Please disregard these errors. Please excuse any errors that have escaped final proofreading.